# Patient Record
Sex: FEMALE | Race: WHITE | Employment: FULL TIME | URBAN - METROPOLITAN AREA
[De-identification: names, ages, dates, MRNs, and addresses within clinical notes are randomized per-mention and may not be internally consistent; named-entity substitution may affect disease eponyms.]

---

## 2018-01-15 ENCOUNTER — OFFICE VISIT (OUTPATIENT)
Dept: OBGYN CLINIC | Age: 33
End: 2018-01-15

## 2018-01-15 VITALS
DIASTOLIC BLOOD PRESSURE: 78 MMHG | SYSTOLIC BLOOD PRESSURE: 104 MMHG | BODY MASS INDEX: 25 KG/M2 | HEIGHT: 64 IN | WEIGHT: 146.4 LBS

## 2018-01-15 DIAGNOSIS — Z01.419 ENCOUNTER FOR GYNECOLOGICAL EXAMINATION WITHOUT ABNORMAL FINDING: Primary | ICD-10-CM

## 2018-01-15 RX ORDER — GABAPENTIN 400 MG/1
CAPSULE ORAL
COMMUNITY
Start: 2018-01-06 | End: 2018-07-31

## 2018-01-15 RX ORDER — ACETAMINOPHEN AND CODEINE PHOSPHATE 120; 12 MG/5ML; MG/5ML
SOLUTION ORAL
Refills: 0 | COMMUNITY
Start: 2017-12-27 | End: 2018-01-15

## 2018-01-15 RX ORDER — SERTRALINE HYDROCHLORIDE 100 MG/1
TABLET, FILM COATED ORAL DAILY
COMMUNITY

## 2018-01-15 RX ORDER — BISMUTH SUBSALICYLATE 262 MG
1 TABLET,CHEWABLE ORAL DAILY
COMMUNITY

## 2018-01-15 NOTE — PROGRESS NOTES
Chief Complaint   Patient presents with    Contraception     on roxana pill wants toswitch birth control      Annual exam ages 21-44    Alix Christy is a ,  33 y.o. female [facility] Patient's last menstrual period was 01/15/2018. .    She presents for her annual checkup. She is having no significant problems. Daughter now 13m old. Severe back pain post vaginal delivery with herniation of several discs; has worked [employer] w PT and injections to avoid surgery    With regard to the Gardasil vaccine, she has received all 3 injections. Menstrual status:    Her periods are light in flow. She is using three to five pads or tampons per day, usually regular and last 26-30 days. She denies dysmenorrhea. She reports no premenstrual symptoms. Contraception:    The current method of family planning is OCP (estrogen/progesterone). Sexual history:     She  reports that she currently engages in sexual activity and has had male partners. She reports using the following method of birth control/protection: Pill. .    Medical conditions:    Since her last annual GYN exam about one year ago, she has not the following changes in her health history: none. Pap and Mammogram History:    Her most recent Pap smear was normal, obtained 1 year(s) ago. Breast Cancer History/Substance Abuse: negative    Past Medical History:   Diagnosis Date    Asthma 1994    exertional episode in childhood; pt states very mild     History reviewed. No pertinent surgical history. Current Outpatient Prescriptions   Medication Sig Dispense Refill    multivitamin (ONE A DAY) tablet Take 1 Tab by mouth daily.  sertraline (ZOLOFT) 100 mg tablet Take  by mouth daily.  gabapentin (NEURONTIN) 400 mg capsule       norethindrone (MICRONOR) 0.35 mg tab TAKE 1 TABLET BY MOUTH EVERY DAY  0    ibuprofen (MOTRIN) 600 mg tablet Take 1 Tab by mouth every six (6) hours as needed for Pain.  30 Tab 0     Allergies: Review of patient's allergies indicates no known allergies. Tobacco History:  reports that she has never smoked. She has never used smokeless tobacco.  Alcohol Abuse:  reports that she drinks about 1.2 oz of alcohol per week   Drug Abuse:  reports that she does not use illicit drugs.     Family Medical/Cancer History:   Family History   Problem Relation Age of Onset    Asthma Mother     Cancer Mother      non hodgkins lymphmoa        Review of Systems - History obtained from the patient  Constitutional: negative for weight loss, fever, night sweats  HEENT: negative for hearing loss, earache, congestion, snoring, sorethroat  CV: negative for chest pain, palpitations, edema  Resp: negative for cough, shortness of breath, wheezing  GI: negative for change in bowel habits, abdominal pain, black or bloody stools  : negative for frequency, dysuria, hematuria, vaginal discharge  MSK: negative for back pain, joint pain, muscle pain  Breast: negative for breast lumps, nipple discharge, galactorrhea  Skin :negative for itching, rash, hives  Neuro: negative for dizziness, headache, confusion, weakness  Psych: negative for anxiety, depression, change in mood  Heme/lymph: negative for bleeding, bruising, pallor    Physical Exam    Visit Vitals    /78    Ht 5' 4\" (1.626 m)    Wt 146 lb 6.4 oz (66.4 kg)    LMP 01/15/2018    BMI 25.13 kg/m2       Constitutional  · Appearance: well-nourished, well developed, alert, in no acute distress    HENT  · Head and Face: appears normal    Chest  · Respiratory Effort: breathing unlabored    Breasts  · Inspection of Breasts: breasts symmetrical, no skin changes, no discharge present, nipple appearance normal, no skin retraction present  · Palpation of Breasts and Axillae: no masses present on palpation, no breast tenderness  · Axillary Lymph Nodes: no lymphadenopathy present    Gastrointestinal  · Abdominal Examination: abdomen non-tender to palpation, normal bowel sounds, no masses present  · Liver and spleen: no hepatomegaly present, spleen not palpable  · Hernias: no hernias identified    Genitourinary  · External Genitalia: normal appearance for age, no discharge present, no tenderness present, no inflammatory lesions present, no masses present, no atrophy present  · Vagina: normal vaginal vault without central or paravaginal defects, no discharge present, no inflammatory lesions present, no masses present  · Bladder: non-tender to palpation  · Urethra: appears normal  · Cervix: normal   · Uterus: normal size, shape and consistency  · Adnexa: no adnexal tenderness present, no adnexal masses present  · Perineum: perineum within normal limits, no evidence of trauma, no rashes or skin lesions present  · Anus: anus within normal limits, no hemorrhoids present  · Inguinal Lymph Nodes: no lymphadenopathy present    Skin  · General Inspection: no rash, no lesions identified    Neurologic/Psychiatric  · Mental Status:  · Orientation: grossly oriented to person, place and time  · Mood and Affect: mood normal, affect appropriate    . Assessment:  Routine gynecologic examination; on menses  Her current medical status is satisfactory with no evidence of significant gynecologic issues.     Plan:  Change off of micronor  Will work w back specialist regarding timing of next pregnancy  Hx of normal paps and no pap today; check one at next visit  Counseled re: diet, exercise, healthy lifestyle  Return for yearly wellness visits  Gardisil counseling provided  Rec screening mammo at either 36 or 41

## 2018-02-16 RX ORDER — ETONOGESTREL AND ETHINYL ESTRADIOL 11.7; 2.7 MG/1; MG/1
1 INSERT, EXTENDED RELEASE VAGINAL
Qty: 3 DEVICE | Refills: 0 | Status: SHIPPED | OUTPATIENT
Start: 2018-02-16 | End: 2018-05-10 | Stop reason: SDUPTHER

## 2018-04-11 RX ORDER — NORETHINDRONE ACETATE AND ETHINYL ESTRADIOL 1MG-20(21)
1 KIT ORAL DAILY
Qty: 3 PACKAGE | Refills: 3 | Status: SHIPPED | OUTPATIENT
Start: 2018-04-11 | End: 2018-07-31

## 2018-05-11 RX ORDER — ETONOGESTREL AND ETHINYL ESTRADIOL .12; .015 MG/D; MG/D
INSERT, EXTENDED RELEASE VAGINAL
Qty: 1 DEVICE | Refills: 12 | Status: SHIPPED | OUTPATIENT
Start: 2018-05-11 | End: 2018-06-28 | Stop reason: SDUPTHER

## 2018-05-15 RX ORDER — FLUCONAZOLE 150 MG/1
150 TABLET ORAL DAILY
Qty: 1 TAB | Refills: 0 | Status: SHIPPED | OUTPATIENT
Start: 2018-05-15 | End: 2018-05-16

## 2018-06-28 RX ORDER — ETONOGESTREL AND ETHINYL ESTRADIOL 11.7; 2.7 MG/1; MG/1
1 INSERT, EXTENDED RELEASE VAGINAL
Qty: 3 DEVICE | Refills: 2 | Status: SHIPPED | OUTPATIENT
Start: 2018-06-28 | End: 2018-07-31

## 2018-06-28 NOTE — PROGRESS NOTES
Received refill request from patient's pharmacy stating that patient's insurance requires 90 day refill. Patient's last appointment was 1/15/18. Refill sent per Dr. Yue Lechuga for 3 refills to get patient through until due for next appointment.

## 2018-07-31 ENCOUNTER — OFFICE VISIT (OUTPATIENT)
Dept: OBGYN CLINIC | Age: 33
End: 2018-07-31

## 2018-07-31 VITALS
WEIGHT: 149.4 LBS | SYSTOLIC BLOOD PRESSURE: 104 MMHG | HEIGHT: 64 IN | DIASTOLIC BLOOD PRESSURE: 72 MMHG | BODY MASS INDEX: 25.51 KG/M2

## 2018-07-31 DIAGNOSIS — B37.9 YEAST INFECTION: Primary | ICD-10-CM

## 2018-07-31 RX ORDER — FLUCONAZOLE 150 MG/1
150 TABLET ORAL DAILY
Qty: 1 TAB | Refills: 3 | Status: SHIPPED | OUTPATIENT
Start: 2018-07-31 | End: 2018-08-01

## 2018-07-31 NOTE — PROGRESS NOTES
Mic Chu is a 29 y.o. female who complains of frequent yeast infections. Has done a course of diflucan and several courses of Monistat, most recently 7/26-7/28 and still c/o symptoms - itching, irritation, white discharge. Has been on probiotic  Recently treated with amoxicillin. sxs relieved now on menses  Her current method of family planning is none. The patient is sexually active. She developed this problem approximately several months ago. Her relevant past medical history:   Past Medical History:   Diagnosis Date    Asthma 1994    exertional episode in childhood; pt states very mild        History reviewed. No pertinent surgical history. Social History     Occupational History    Not on file. Social History Main Topics    Smoking status: Never Smoker    Smokeless tobacco: Never Used    Alcohol use 1.2 oz/week     2 Glasses of wine per week      Comment: weekly    Drug use: No    Sexual activity: Yes     Partners: Male     Birth control/ protection: None     Family History   Problem Relation Age of Onset    Asthma Mother     Cancer Mother      non hodgkins lymphmoa       No Known Allergies  Prior to Admission medications    Medication Sig Start Date End Date Taking? Authorizing Provider   B.infantis-B.ani-B.long-Tracie (PROBIOTIC 4X) 10-15 mg TbEC Take  by mouth. Yes Historical Provider   multivitamin (ONE A DAY) tablet Take 1 Tab by mouth daily. Yes Historical Provider   sertraline (ZOLOFT) 100 mg tablet Take  by mouth daily. Yes Historical Provider   ethinyl estradiol-etonogestrel (NUVARING) 0.12-0.015 mg/24 hr vaginal ring 1 Each by Intravaginal route every twenty-one (21) days. 4/82/52   Gisele Wisdom MD   norethindrone-ethinyl estradiol (LOESTRIN FE 1/20, 28-DAY,) 1 mg-20 mcg (21)/75 mg (7) tab Take 1 Tab by mouth daily.  3/04/02   Gisele Wisdom MD   gabapentin (NEURONTIN) 400 mg capsule  1/6/18   Historical Provider   ibuprofen (MOTRIN) 600 mg tablet Take 1 Tab by mouth every six (6) hours as needed for Pain.  10/21/16   Lc Razo MD        Review of Systems - History obtained from the patient  Constitutional: negative for weight loss, fever, night sweats  HEENT: negative for hearing loss, earache, congestion, snoring, sorethroat  CV: negative for chest pain, palpitations, edema  Resp: negative for cough, shortness of breath, wheezing  Breast: negative for breast lumps, nipple discharge, galactorrhea  GI: negative for change in bowel habits, abdominal pain, black or bloody stools  : negative for frequency, dysuria, hematuria  MSK: negative for back pain, joint pain, muscle pain  Skin: negative for itching, rash, hives  Neuro: negative for dizziness, headache, confusion, weakness  Psych: negative for anxiety, depression, change in mood  Heme/lymph: negative for bleeding, bruising, pallor      Objective:  Visit Vitals    /72 (BP 1 Location: Left arm, BP Patient Position: Sitting)    Ht 5' 4\" (1.626 m)    Wt 149 lb 6.4 oz (67.8 kg)    LMP 07/29/2018    BMI 25.64 kg/m2          PHYSICAL EXAMINATION    Constitutional  · Appearance: well-nourished, well developed, alert, in no acute distress    HENT  · Head and Face: appears normal    Neck  · Inspection/Palpation: normal appearance, no masses or tenderness  · Lymph Nodes: no lymphadenopathy present  · Thyroid: gland size normal, nontender, no nodules or masses present on palpation  ·   Breasts  · Inspection of Breasts: breasts symmetrical, no skin changes, no discharge present, nipple appearance normal, no skin retraction present  · Palpation of Breasts and Axillae: no masses present on palpation, no breast tenderness  · Axillary Lymph Nodes: no lymphadenopathy present    Gastrointestinal  · Abdominal Examination: abdomen non-tender to palpation, normal bowel sounds, no masses present  · Liver and spleen: no hepatomegaly present, spleen not palpable  · Hernias: no hernias identified    Genitourinary  · External Genitalia: normal appearance for age, no discharge present, no tenderness present, no inflammatory lesions present, no masses present, no atrophy present  · Vagina: normal vaginal vault without central or paravaginal defects, no discharge present, no inflammatory lesions present, no masses present  · Bladder: non-tender to palpation  · Urethra: appears normal  · Cervix: normal   · Uterus: normal size, shape and consistency  · Adnexa: no adnexal tenderness present, no adnexal masses present  · Perineum: perineum within normal limits, no evidence of trauma, no rashes or skin lesions present  · Anus: anus within normal limits, no hemorrhoids present  · Inguinal Lymph Nodes: no lymphadenopathy present    Skin  · General Inspection: no rash, no lesions identified    Neurologic/Psychiatric  · Mental Status:  · Orientation: grossly oriented to person, place and time  · Mood and Affect: mood normal, affect appropriate    Assessment:      Resolution of recent yeast infection  Plan:   Script for diflucan  Preconceptual counseling

## 2021-06-04 ENCOUNTER — HOSPITAL ENCOUNTER (OUTPATIENT)
Dept: GENERAL RADIOLOGY | Age: 36
Discharge: HOME | End: 2021-06-04
Attending: PAIN MEDICINE
Payer: COMMERCIAL

## 2021-06-04 ENCOUNTER — TRANSCRIBE ORDER (OUTPATIENT)
Dept: GENERAL RADIOLOGY | Age: 36
End: 2021-06-04

## 2021-06-04 DIAGNOSIS — M51.16 LUMBAR DISC PROLAPSE WITH ROOT COMPRESSION: ICD-10-CM

## 2021-06-04 DIAGNOSIS — M51.16 LUMBAR DISC PROLAPSE WITH ROOT COMPRESSION: Primary | ICD-10-CM

## 2021-06-04 PROCEDURE — 72100 X-RAY EXAM L-S SPINE 2/3 VWS: CPT

## 2023-02-08 ENCOUNTER — TRANSCRIBE ORDER (OUTPATIENT)
Dept: SCHEDULING | Age: 38
End: 2023-02-08

## 2023-02-08 DIAGNOSIS — M51.9 INTERVERTEBRAL DISC DISORDER: Primary | ICD-10-CM

## 2023-02-14 ENCOUNTER — HOSPITAL ENCOUNTER (OUTPATIENT)
Dept: MRI IMAGING | Age: 38
Discharge: HOME | End: 2023-02-14
Attending: PAIN MEDICINE
Payer: COMMERCIAL

## 2023-02-14 DIAGNOSIS — M51.9 INTERVERTEBRAL DISC DISORDER: ICD-10-CM

## 2023-02-14 PROCEDURE — 72148 MRI LUMBAR SPINE W/O DYE: CPT

## 2023-02-16 ENCOUNTER — HOSPITAL ENCOUNTER (OUTPATIENT)
Dept: LAB | Age: 38
Discharge: HOME | End: 2023-02-16

## 2023-03-27 NOTE — PROGRESS NOTES
[facility] [place] at [address]  W: [phone]  F: [phone]    Reason for Visit:   Nidia Apgar is a 40 y.o. female with positive ANITA who is seen in consultation at the request of Dr. Noelle Caba for evaluation of elevated iron. History of Present Illness:   Nidia Apgar is a pleasant 40 y.o. female who presents today for evaluation of elevated iron. Patient is an excellent historian. 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 States that she has had progressive fatigue x 1 year. She had COVID in 2022. After that, broke out in herpetiform oral sores on mouth that lasted ~1 month. States that these have recurred 6 times since. Also continued to have progressive fatigue. Ended up requiring Adderall to focus, which was a significant difference for her. She was later evaluated at urgent care and diagnosed with flu. Has had testing to rule herpes. Biopsy of oral ulcers in 3873 lichen planus (reviewed in Media)   Has no history of vaginal ulcers. Also had cervical lymph nodes. Ultrasound confirmed ~2 cm lymphadenopathy. Was subsequently diagnosed with EBV mono in early January. Patient was seen by her Rheumatologist Dr. Tigist Mo  for evaluation of positive ANITA. Per outside note, she had labs that showed elevated iron 191, elevated TIBC, normal ferritin and iron sat. Labs from 23 notable for: Transferrin elevated at 421. Hereditary hemochromatosis testing negative (see below). 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 She does not take any oral iron. She has required oral iron for both pregnancies ( ). No history of blood transfusions. Denies abdominal bloating, melena, hematochezia.      Menstrual periods: lasts 5 days, light flow, requires tampon change every 8 hours        Labs (reviewed on patient's phone)  TIBC 478  Iron 191  Ferritin 19  TSH wnl    Social History: never smoker. Social EtOH use. 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 Excercises 3-4x per week. Family History:  Brother - psoriasis, Guiyon  Sister - no significant medical issues  Mother - non-Hodgkin lymphoma    Review of systems was obtained and pertinent findings reviewed above. Past medical history, social history, family history, medications, and allergies are located in the electronic medical record. Physical Exam:   Visit Vitals  /82 (BP 1 Location: Right upper arm, BP Patient Position: Sitting)   Pulse 96   Temp 97.5 °F (36.4 °C)   Resp 18   Ht 5' 4\" (1.626 m)   Wt 145 lb (65.8 kg)   SpO2 97%   BMI 24.89 kg/m²     ECOG PS: 0-1  General: no distress  Eyes: anicteric sclerae  HENT: oropharynx clear with no visible oral ulcers, slight mucositis, no thrush, no angular chelitis  Neck: supple  Lymphatic: shoddy R anterior cervical lymph node, mobile  Respiratory: normal respiratory effort  CV: RRR, no peripheral edema  Ext: warm, well perfused  GI: soft, nontender, nondistended, no masses  Skin: no rashes, no ecchymoses, no petechiae    Results:     Lab Results   Component Value Date/Time    WBC 10.6 10/17/2016 06:13 PM    HGB 12.9 10/17/2016 06:13 PM    HCT 37.3 10/17/2016 06:13 PM    PLATELET 273 66/45/1152 06:13 PM    MCV 88.6 10/17/2016 06:13 PM     No results found for: NA, K, CL, CO2, GLU, BUN, CREA, GFRAA, GFRNA, CA, NAPOC, KPOCT, CLPOC, GLUCPOC, IBUN, CREAPOC, ICAI  No results found for: TBILI, ALT, AP, TP, ALB, GLOB    Records from Dr. Villa Race office reviewed in Media and summarized above. Test results above have been reviewed. Assessment:     #) Elevated iron and transferrin, low ferritin:   Patient has incongruent lab studies with low ferritin (19) and high TIBC, which are suggestive of iron deficiency, but elevated iron and elevated transferrin. The pattern does not appear consistent with hemochromatosis or iron overload.     HFE genetic testing negative. Will plan to repeat the ferritin and iron testing now. #) Positive oral ulcers, ANITA, fatigue: evaluation by Rheumatology. Biopsy of oral ulcer not diagnostic (?lichen planus). HIV screen, total immunoglobulins. 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   Further management per Rheumatologist.    Plan:     CBC with diff, ferritin, iron profile, retic count, LDH, ESR, CRP HIV  Return to clinic in ~1 week to discuss results    Signed By: Antoinette Taylor MD

## 2023-03-28 ENCOUNTER — OFFICE VISIT (OUTPATIENT)
Dept: ONCOLOGY | Age: 38
End: 2023-03-28
Payer: COMMERCIAL

## 2023-03-28 VITALS
BODY MASS INDEX: 24.75 KG/M2 | TEMPERATURE: 97.5 F | SYSTOLIC BLOOD PRESSURE: 123 MMHG | HEART RATE: 96 BPM | HEIGHT: 64 IN | RESPIRATION RATE: 18 BRPM | WEIGHT: 145 LBS | DIASTOLIC BLOOD PRESSURE: 82 MMHG | OXYGEN SATURATION: 97 %

## 2023-03-28 DIAGNOSIS — R53.83 FATIGUE, UNSPECIFIED TYPE: ICD-10-CM

## 2023-03-28 DIAGNOSIS — R79.89 ELEVATED FERRITIN: Primary | ICD-10-CM

## 2023-03-28 DIAGNOSIS — K13.79 RECURRENT ORAL ULCERS: ICD-10-CM

## 2023-03-28 DIAGNOSIS — E61.1 IRON DEFICIENCY: ICD-10-CM

## 2023-03-28 PROCEDURE — 99204 OFFICE O/P NEW MOD 45 MIN: CPT | Performed by: INTERNAL MEDICINE

## 2023-03-28 RX ORDER — GABAPENTIN 300 MG/1
300 CAPSULE ORAL AS NEEDED
COMMUNITY

## 2023-03-28 RX ORDER — ERGOCALCIFEROL 1.25 MG/1
50000 CAPSULE ORAL
COMMUNITY

## 2023-03-28 RX ORDER — DEXTROAMPHETAMINE SACCHARATE, AMPHETAMINE ASPARTATE, DEXTROAMPHETAMINE SULFATE AND AMPHETAMINE SULFATE 3.75; 3.75; 3.75; 3.75 MG/1; MG/1; MG/1; MG/1
15 TABLET ORAL
COMMUNITY

## 2023-03-28 NOTE — PROGRESS NOTES
Clarence Esquivel is a 40 y.o. female    elevated iron. 1. Have you been to the ER, urgent care clinic since your last visit? Hospitalized since your last visit? N/A    2. Have you seen or consulted any other health care providers outside of the [address] [place] [address] since your last visit? Include any pap smears or colon screening.  N/A

## 2023-03-28 NOTE — LETTER
3/28/2023    Patient: Naren Enriquez   YOB: 1985   Date of Visit: 3/28/2023     Mark Jarrett NP  [address] [facility] [address]  Via Fax: [phone]    Dear Mark Jarrett NP,      Thank you for referring Ms. Naren Enriquez to [address] [place] [address] for evaluation. My notes for this consultation are attached. If you have questions, please do not hesitate to call me. I look forward to following your patient along with you.       Sincerely,    Bianka Li MD

## 2023-04-03 LAB
ALBUMIN SERPL-MCNC: 4.5 G/DL (ref 3.8–4.8)
ALBUMIN/GLOB SERPL: 1.7 {RATIO} (ref 1.2–2.2)
ALP SERPL-CCNC: 80 IU/L (ref 44–121)
ALT SERPL-CCNC: 12 IU/L (ref 0–32)
AST SERPL-CCNC: 22 IU/L (ref 0–40)
BASOPHILS # BLD AUTO: 0 X10E3/UL (ref 0–0.2)
BASOPHILS NFR BLD AUTO: 1 %
BILIRUB SERPL-MCNC: 0.4 MG/DL (ref 0–1.2)
BUN SERPL-MCNC: 11 MG/DL (ref 6–20)
BUN/CREAT SERPL: 14 (ref 9–23)
CALCIUM SERPL-MCNC: 10.2 MG/DL (ref 8.7–10.2)
CHLORIDE SERPL-SCNC: 98 MMOL/L (ref 96–106)
CO2 SERPL-SCNC: 25 MMOL/L (ref 20–29)
CREAT SERPL-MCNC: 0.81 MG/DL (ref 0.57–1)
CRP SERPL-MCNC: 38 MG/L (ref 0–10)
EGFRCR SERPLBLD CKD-EPI 2021: 96 ML/MIN/1.73
EOSINOPHIL # BLD AUTO: 0.2 X10E3/UL (ref 0–0.4)
EOSINOPHIL NFR BLD AUTO: 4 %
ERYTHROCYTE [DISTWIDTH] IN BLOOD BY AUTOMATED COUNT: 12.2 % (ref 11.7–15.4)
ERYTHROCYTE [SEDIMENTATION RATE] IN BLOOD BY WESTERGREN METHOD: 25 MM/HR (ref 0–32)
FERRITIN SERPL-MCNC: 31 NG/ML (ref 15–150)
GLIADIN PEPTIDE IGA SER-ACNC: 4 UNITS (ref 0–19)
GLOBULIN SER CALC-MCNC: 2.7 G/DL (ref 1.5–4.5)
GLUCOSE SERPL-MCNC: 89 MG/DL (ref 70–99)
HCT VFR BLD AUTO: 34.2 % (ref 34–46.6)
HGB BLD-MCNC: 11.9 G/DL (ref 11.1–15.9)
HIV 1+2 AB+HIV1 P24 AG SERPL QL IA: NON REACTIVE
IGA SERPL-MCNC: 165 MG/DL (ref 87–352)
IGG SERPL-MCNC: 1022 MG/DL (ref 586–1602)
IGM SERPL-MCNC: 213 MG/DL (ref 26–217)
IMM GRANULOCYTES # BLD AUTO: 0 X10E3/UL (ref 0–0.1)
IMM GRANULOCYTES NFR BLD AUTO: 0 %
IRON SATN MFR SERPL: 12 % (ref 15–55)
IRON SERPL-MCNC: 63 UG/DL (ref 27–159)
LDH SERPL L TO P-CCNC: 158 IU/L (ref 119–226)
LYMPHOCYTES # BLD AUTO: 1 X10E3/UL (ref 0.7–3.1)
LYMPHOCYTES NFR BLD AUTO: 19 %
MCH RBC QN AUTO: 30.7 PG (ref 26.6–33)
MCHC RBC AUTO-ENTMCNC: 34.8 G/DL (ref 31.5–35.7)
MCV RBC AUTO: 88 FL (ref 79–97)
MONOCYTES # BLD AUTO: 0.5 X10E3/UL (ref 0.1–0.9)
MONOCYTES NFR BLD AUTO: 10 %
NEUTROPHILS # BLD AUTO: 3.6 X10E3/UL (ref 1.4–7)
NEUTROPHILS NFR BLD AUTO: 66 %
PLATELET # BLD AUTO: 250 X10E3/UL (ref 150–450)
POTASSIUM SERPL-SCNC: 4.8 MMOL/L (ref 3.5–5.2)
PROT SERPL-MCNC: 7.2 G/DL (ref 6–8.5)
RBC # BLD AUTO: 3.87 X10E6/UL (ref 3.77–5.28)
RETICS/RBC NFR AUTO: 1.1 % (ref 0.6–2.6)
SODIUM SERPL-SCNC: 137 MMOL/L (ref 134–144)
TIBC SERPL-MCNC: 509 UG/DL (ref 250–450)
TTG IGA SER-ACNC: <2 U/ML (ref 0–3)
UIBC SERPL-MCNC: 446 UG/DL (ref 131–425)
WBC # BLD AUTO: 5.4 X10E3/UL (ref 3.4–10.8)

## 2023-04-07 PROBLEM — E61.1 IRON DEFICIENCY: Status: ACTIVE | Noted: 2023-04-07

## 2023-04-18 ENCOUNTER — OFFICE VISIT (OUTPATIENT)
Dept: ONCOLOGY | Age: 38
End: 2023-04-18
Payer: COMMERCIAL

## 2023-04-18 VITALS
WEIGHT: 148 LBS | DIASTOLIC BLOOD PRESSURE: 87 MMHG | OXYGEN SATURATION: 99 % | RESPIRATION RATE: 18 BRPM | SYSTOLIC BLOOD PRESSURE: 125 MMHG | HEART RATE: 101 BPM | HEIGHT: 64 IN | TEMPERATURE: 98 F | BODY MASS INDEX: 25.27 KG/M2

## 2023-04-18 DIAGNOSIS — E61.1 IRON DEFICIENCY: Primary | ICD-10-CM

## 2023-04-18 DIAGNOSIS — D89.0 POLYCLONAL GAMMOPATHY: ICD-10-CM

## 2023-04-18 PROCEDURE — 99213 OFFICE O/P EST LOW 20 MIN: CPT | Performed by: INTERNAL MEDICINE

## 2023-04-18 NOTE — PROGRESS NOTES
[facility] at [address]  W: [phone]  F: [phone]    Reason for Visit:   Shanice Soria is a 40 y.o. female with positive ANITA who is seen for follow up of iron deficiency. Assessment:     #) Iron deficiency:   Repeat labs with clear iron deficiency (ferritin 31, iron sat 12%, high TIBC). Likely contributing to fatigue. Started on oral iron every third day with very poor tolerance. Etiology likely due to menstrual blood loss. Celiac screen negative. Proceed with IV iron infusions as scheduled    #) Positive ANITA, oral ulcers: evaluation by Rheumatology. Biopsy of oral ulcer not diagnostic (?lichen planus). Stopped since weaning off Zoloft. Possibly related to Zoloft. HIV negative, total immunoglobulins wnl. 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 Further management per Rheumatologist.    Plan:     Stop oral iron given intolerance  Proceed with IV iron x 2 doses  Return to clinic in 4 months with repeat labs prior to visit    Signed By: Evangelista Sanchez MD        History of Present Illness:   Shanice Soria is a pleasant 40 y.o. female who presents today for evaluation of elevated iron. Patient is an excellent historian. 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 States that she has had progressive fatigue x 1 year. She had COVID in July 2022. After that, broke out in herpetiform oral sores on mouth that lasted ~1 month. States that these have recurred 6 times since. Also continued to have progressive fatigue. Ended up requiring Adderall to focus, which was a significant difference for her. She was later evaluated at urgent care and diagnosed with flu. Has had testing to rule herpes. Biopsy of oral ulcers in 8/5626 lichen planus (reviewed in Media)   Has no history of vaginal ulcers. Also had cervical lymph nodes. Ultrasound confirmed ~2 cm lymphadenopathy.   Was subsequently diagnosed with EBV mono in early January. Patient was seen by her Rheumatologist Dr. Evy Collier  for evaluation of positive ANITA. Per outside note, she had labs that showed elevated iron 191, elevated TIBC, normal ferritin and iron sat. Labs from 23 notable for: Transferrin elevated at 421. Hereditary hemochromatosis testing negative (see below). 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 She does not take any oral iron. She has required oral iron for both pregnancies ( ). No history of blood transfusions. Denies abdominal bloating, melena, hematochezia. Menstrual periods: lasts 5 days, light flow, requires tampon change every 8 hours        Labs (reviewed on patient's phone)  TIBC 478  Iron 191  Ferritin 19  TSH wnl    Interval History:  She is feeling much better. She has accepted a new position at [employer] and is very excited about it. States a weight has been lifted knowing that her new schedule will not involve nights and weekends. Weaned off of Zoloft in the last month. Stopped 2 weeks ago. Has not had any recurrent ulcers. Labs on 3/31/23 with normal WBC, normal diff, normal hemoglobin (11.9), and normal platelets but iron deficiency with ferritin 31, iron sat 12%, TIBC elevated. Started ferrous sulfate. Unable to tolerate due to GI distress but has been trying to take every third day. Celiac screening negative   Total immunoglobulins wnl  Cr, LFTs wnl  ESR, LD wnl  HIV negative   CRP elevated at 38    Social History: never smoker. Social EtOH use. 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 Excercises 3-4x per week. Family History:  Brother - psoriasis, Guiyon  Sister - no significant medical issues  Mother - non-Hodgkin lymphoma    Review of systems was obtained and pertinent findings reviewed above.  Past medical history, social history, family history, medications, and allergies are located in the electronic medical record. Physical Exam:   Visit Vitals  /87 (BP 1 Location: Right upper arm, BP Patient Position: Sitting)   Pulse (!) 101   Temp 98 °F (36.7 °C)   Resp 18   Ht 5' 4\" (1.626 m)   Wt 148 lb (67.1 kg)   SpO2 99%   BMI 25.40 kg/m²       ECOG PS: 0-1  General: no distress  Eyes: anicteric sclerae  HENT: oropharynx clear with no visible oral ulcers, slight mucositis, no thrush, no angular chelitis  Neck: supple  Lymphatic: shoddy R anterior cervical lymph node, mobile  Respiratory: normal respiratory effort  CV: RRR, no peripheral edema  Ext: warm, well perfused  GI: soft, nontender, nondistended, no masses  Skin: no rashes, no ecchymoses, no petechiae    Results:     Lab Results   Component Value Date/Time    WBC 5.4 03/31/2023 10:55 AM    HGB 11.9 03/31/2023 10:55 AM    HCT 34.2 03/31/2023 10:55 AM    PLATELET 209 14/63/1063 10:55 AM    MCV 88 03/31/2023 10:55 AM    ABS. NEUTROPHILS 3.6 03/31/2023 10:55 AM     Lab Results   Component Value Date/Time    Sodium 137 03/31/2023 10:55 AM    Potassium 4.8 03/31/2023 10:55 AM    Chloride 98 03/31/2023 10:55 AM    CO2 25 03/31/2023 10:55 AM    Glucose 89 03/31/2023 10:55 AM    BUN 11 03/31/2023 10:55 AM    Creatinine 0.81 03/31/2023 10:55 AM    Calcium 10.2 03/31/2023 10:55 AM     Lab Results   Component Value Date/Time    Bilirubin, total 0.4 03/31/2023 10:55 AM    ALT (SGPT) 12 03/31/2023 10:55 AM    Alk. phosphatase 80 03/31/2023 10:55 AM    Protein, total 7.2 03/31/2023 10:55 AM    Albumin 4.5 03/31/2023 10:55 AM       Records from Dr. Liborio Kim office reviewed in Media and summarized above. Test results above have been reviewed.

## 2023-04-18 NOTE — PROGRESS NOTES
Thiago Schofield is a 40 y.o. female    follow up of iron deficiency. 1. Have you been to the ER, urgent care clinic since your last visit? Hospitalized since your last visit? No    2. Have you seen or consulted any other health care providers outside of the [address] [place] [address] since your last visit? Include any pap smears or colon screening.  No

## 2023-04-20 ENCOUNTER — APPOINTMENT (OUTPATIENT)
Dept: INFUSION THERAPY | Age: 38
End: 2023-04-20

## 2023-04-21 ENCOUNTER — HOSPITAL ENCOUNTER (OUTPATIENT)
Dept: INFUSION THERAPY | Age: 38
Discharge: HOME | End: 2023-04-21
Attending: INTERNAL MEDICINE
Payer: COMMERCIAL

## 2023-04-21 VITALS
WEIGHT: 148 LBS | TEMPERATURE: 98.9 F | BODY MASS INDEX: 25.27 KG/M2 | SYSTOLIC BLOOD PRESSURE: 100 MMHG | RESPIRATION RATE: 18 BRPM | DIASTOLIC BLOOD PRESSURE: 78 MMHG | HEIGHT: 64 IN | HEART RATE: 70 BPM | OXYGEN SATURATION: 99 %

## 2023-04-21 DIAGNOSIS — E61.1 IRON DEFICIENCY: Primary | ICD-10-CM

## 2023-04-21 PROCEDURE — 74011250636 HC RX REV CODE- 250/636: Performed by: INTERNAL MEDICINE

## 2023-04-21 PROCEDURE — 96365 THER/PROPH/DIAG IV INF INIT: CPT

## 2023-04-21 PROCEDURE — 74011000250 HC RX REV CODE- 250: Performed by: INTERNAL MEDICINE

## 2023-04-21 PROCEDURE — 96366 THER/PROPH/DIAG IV INF ADDON: CPT

## 2023-04-21 RX ORDER — HYDROCORTISONE SODIUM SUCCINATE 100 MG/2ML
100 INJECTION, POWDER, FOR SOLUTION INTRAMUSCULAR; INTRAVENOUS AS NEEDED
Status: CANCELLED | OUTPATIENT
Start: 2023-04-28

## 2023-04-21 RX ORDER — HEPARIN 100 UNIT/ML
500 SYRINGE INTRAVENOUS AS NEEDED
Status: ACTIVE | OUTPATIENT
Start: 2023-04-21 | End: 2023-04-21

## 2023-04-21 RX ORDER — DIPHENHYDRAMINE HYDROCHLORIDE 50 MG/ML
25 INJECTION, SOLUTION INTRAMUSCULAR; INTRAVENOUS AS NEEDED
Status: CANCELLED
Start: 2023-04-28

## 2023-04-21 RX ORDER — SODIUM CHLORIDE 9 MG/ML
5-250 INJECTION, SOLUTION INTRAVENOUS AS NEEDED
Status: DISPENSED | OUTPATIENT
Start: 2023-04-21 | End: 2023-04-21

## 2023-04-21 RX ORDER — SODIUM CHLORIDE 9 MG/ML
5-250 INJECTION, SOLUTION INTRAVENOUS AS NEEDED
Status: CANCELLED | OUTPATIENT
Start: 2023-04-28

## 2023-04-21 RX ORDER — SODIUM CHLORIDE 9 MG/ML
5-40 INJECTION INTRAVENOUS AS NEEDED
Status: CANCELLED | OUTPATIENT
Start: 2023-04-28

## 2023-04-21 RX ORDER — HEPARIN 100 UNIT/ML
500 SYRINGE INTRAVENOUS AS NEEDED
Status: CANCELLED
Start: 2023-04-28

## 2023-04-21 RX ORDER — DIPHENHYDRAMINE HYDROCHLORIDE 50 MG/ML
50 INJECTION, SOLUTION INTRAMUSCULAR; INTRAVENOUS AS NEEDED
Status: CANCELLED
Start: 2023-04-28

## 2023-04-21 RX ORDER — SODIUM CHLORIDE 0.9 % (FLUSH) 0.9 %
5-40 SYRINGE (ML) INJECTION AS NEEDED
Status: CANCELLED | OUTPATIENT
Start: 2023-04-28

## 2023-04-21 RX ORDER — ALBUTEROL SULFATE 0.83 MG/ML
2.5 SOLUTION RESPIRATORY (INHALATION) AS NEEDED
Status: CANCELLED
Start: 2023-04-28

## 2023-04-21 RX ORDER — ACETAMINOPHEN 325 MG/1
650 TABLET ORAL AS NEEDED
Status: CANCELLED
Start: 2023-04-28

## 2023-04-21 RX ORDER — SODIUM CHLORIDE 0.9 % (FLUSH) 0.9 %
5-40 SYRINGE (ML) INJECTION AS NEEDED
Status: DISPENSED | OUTPATIENT
Start: 2023-04-21 | End: 2023-04-21

## 2023-04-21 RX ORDER — ONDANSETRON 2 MG/ML
8 INJECTION INTRAMUSCULAR; INTRAVENOUS AS NEEDED
Status: CANCELLED | OUTPATIENT
Start: 2023-04-28

## 2023-04-21 RX ORDER — EPINEPHRINE 1 MG/ML
0.3 INJECTION, SOLUTION, CONCENTRATE INTRAVENOUS AS NEEDED
Status: CANCELLED | OUTPATIENT
Start: 2023-04-28

## 2023-04-21 RX ORDER — SODIUM CHLORIDE 9 MG/ML
5-40 INJECTION INTRAVENOUS AS NEEDED
Status: ACTIVE | OUTPATIENT
Start: 2023-04-21 | End: 2023-04-21

## 2023-04-21 RX ADMIN — SODIUM CHLORIDE 25 ML/HR: 9 INJECTION, SOLUTION INTRAVENOUS at 09:00

## 2023-04-21 RX ADMIN — SODIUM CHLORIDE, PRESERVATIVE FREE 10 ML: 5 INJECTION INTRAVENOUS at 08:40

## 2023-04-21 RX ADMIN — SODIUM CHLORIDE, PRESERVATIVE FREE 10 ML: 5 INJECTION INTRAVENOUS at 11:20

## 2023-04-21 RX ADMIN — IRON SUCROSE 300 MG: 20 INJECTION, SOLUTION INTRAVENOUS at 09:30

## 2023-04-21 NOTE — PROGRESS NOTES
[place] Short Note                   Date: 2023  Name: Telma Mast  MRN: [id number]       : 1985    0835: Pt admit to [facility] for Iron Sucrose  Denies pain, fever, cough, N/V, recent falls. PIV Left AC, 24g. Positive blood return. Tolerated infusion well without issue. Patient declined post infusion monitoring time. Education provided. PIV removed post infusion. Coban and gauze applied to site    Patient aware of upcoming appointment. Ms. Zamudio's vitals were reviewed prior to treatment.    Patient Vitals for the past 12 hrs:   Temp Pulse Resp BP SpO2   23 1115 98.9 °F (37.2 °C) 70 18 100/78 99 %   23 0835 98.9 °F (37.2 °C) 90 18 104/69 99 %     Medications Administered       0.9% sodium chloride infusion       Admin Date  2023 Action  New Bag Dose  25 mL/hr Rate  25 mL/hr Route  IntraVENous Administered By  Hussein Alexander RN         0.9% sodium chloride injection 5-40 mL       Admin Date  2023 Action  Given Dose  10 mL Route  IntraVENous Administered By  Hussein Alexander RN      Admin Date  2023 Action  Given Dose  10 mL Route  IntraVENous Administered By  Hussein Alexander RN         iron sucrose (VENOFER) 300 mg in 0.9% sodium chloride 250 mL IVPB       Admin Date  2023 Action  New Bag Dose  300 mg Rate  193.3 mL/hr Route  IntraVENous Administered By  Hussein Alexander RN        Future Appointments   Date Time Provider Nino Perdue   2023  9:00 AM [facility]   2023  1:00 PM Jacqueline Salcedo  N Nish Astudillo RN  2023  -----------------------------------------------------------------------------------------------------------------  Problem: Chemotherapy Treatment  Goal: *Hemodynamically stable  Outcome: Progressing Towards Goal  Goal: *Tolerating diet  Outcome: Progressing Towards Goal     Problem: Neutropenia  Goal: *Verbalizes and demonstrates neutropenic precautions  Outcome: Progressing Towards Goal  Goal: *Verbalizes understanding and describes prescribed diet  Outcome: Progressing Towards Goal     Problem: Infection - Risk of, Central Venous Catheter-Associated Bloodstream Infection  Goal: *Absence of infection signs and symptoms  Outcome: Progressing Towards Goal     Problem: Pain  Goal: *Control of Pain  Outcome: Progressing Towards Goal     Problem: Anemia Care Plan (Adult and Pediatric)  Goal: *Labs within defined limits  Outcome: Progressing Towards Goal  Goal: *Tolerates increased activity  Outcome: Progressing Towards Goal     Problem: Anxiety  Goal: *Alleviation of anxiety  Outcome: Progressing Towards Goal     Problem: Altered Thought Process (Adult)  Goal: *Participates in Treatment Plan  Outcome: Progressing Towards Goal     Problem: Constipation - Risk of  Goal: *Prevention of constipation  Outcome: Progressing Towards Goal     Problem: Knowledge Deficit  Goal: *Verbalizes understanding of procedures and medications  Outcome: Progressing Towards Goal     Problem: Patient Education:  Go to Education Activity  Goal: Patient/Family Education  Outcome: Progressing Towards Goal

## 2023-04-23 DIAGNOSIS — E61.1 IRON DEFICIENCY: Primary | ICD-10-CM

## 2023-04-24 DIAGNOSIS — E61.1 IRON DEFICIENCY: Primary | ICD-10-CM

## 2023-04-25 DIAGNOSIS — E61.1 IRON DEFICIENCY: Primary | ICD-10-CM

## 2023-04-26 ENCOUNTER — APPOINTMENT (OUTPATIENT)
Dept: INFUSION THERAPY | Age: 38
End: 2023-04-26
Attending: INTERNAL MEDICINE

## 2023-05-05 ENCOUNTER — APPOINTMENT (OUTPATIENT)
Dept: INFUSION THERAPY | Age: 38
End: 2023-05-05
Attending: INTERNAL MEDICINE

## 2023-05-22 RX ORDER — GABAPENTIN 300 MG/1
300 CAPSULE ORAL PRN
COMMUNITY

## 2023-05-22 RX ORDER — ERGOCALCIFEROL 1.25 MG/1
50000 CAPSULE ORAL
COMMUNITY

## 2023-05-22 RX ORDER — DEXTROAMPHETAMINE SACCHARATE, AMPHETAMINE ASPARTATE, DEXTROAMPHETAMINE SULFATE AND AMPHETAMINE SULFATE 3.75; 3.75; 3.75; 3.75 MG/1; MG/1; MG/1; MG/1
15 TABLET ORAL
COMMUNITY

## 2023-08-09 ENCOUNTER — TELEPHONE (OUTPATIENT)
Age: 38
End: 2023-08-09

## 2023-08-09 NOTE — TELEPHONE ENCOUNTER
1324    Tried to call patient. And I Was only able to reach Voicemail. I have Left a detailed message just letting her know about up coming appointment and patient need labs prior  to visit to please give our office a call back if any question or concerns.

## 2023-08-11 LAB
BASOPHILS # BLD AUTO: 0 X10E3/UL (ref 0–0.2)
BASOPHILS NFR BLD AUTO: 0 %
EOSINOPHIL # BLD AUTO: 0.2 X10E3/UL (ref 0–0.4)
EOSINOPHIL NFR BLD AUTO: 3 %
ERYTHROCYTE [DISTWIDTH] IN BLOOD BY AUTOMATED COUNT: 11.9 % (ref 11.7–15.4)
FERRITIN SERPL-MCNC: 45 NG/ML (ref 15–150)
HCT VFR BLD AUTO: 36.3 % (ref 34–46.6)
HGB BLD-MCNC: 12.1 G/DL (ref 11.1–15.9)
IMM GRANULOCYTES # BLD AUTO: 0 X10E3/UL (ref 0–0.1)
IMM GRANULOCYTES NFR BLD AUTO: 0 %
IRON SATN MFR SERPL: 33 % (ref 15–55)
IRON SERPL-MCNC: 155 UG/DL (ref 27–159)
LYMPHOCYTES # BLD AUTO: 1.9 X10E3/UL (ref 0.7–3.1)
LYMPHOCYTES NFR BLD AUTO: 31 %
MCH RBC QN AUTO: 30.3 PG (ref 26.6–33)
MCHC RBC AUTO-ENTMCNC: 33.3 G/DL (ref 31.5–35.7)
MCV RBC AUTO: 91 FL (ref 79–97)
MONOCYTES # BLD AUTO: 0.4 X10E3/UL (ref 0.1–0.9)
MONOCYTES NFR BLD AUTO: 7 %
NEUTROPHILS # BLD AUTO: 3.7 X10E3/UL (ref 1.4–7)
NEUTROPHILS NFR BLD AUTO: 59 %
PLATELET # BLD AUTO: 297 X10E3/UL (ref 150–450)
RBC # BLD AUTO: 3.99 X10E6/UL (ref 3.77–5.28)
TIBC SERPL-MCNC: 468 UG/DL (ref 250–450)
UIBC SERPL-MCNC: 313 UG/DL (ref 131–425)
WBC # BLD AUTO: 6.2 X10E3/UL (ref 3.4–10.8)

## 2023-08-13 NOTE — PROGRESS NOTES
[id number] [facility]   [phone]    Hematology / Oncology Follow-up    Reason for Visit:   Junie Ruiz is a 41 y.o. female who is seen for follow-up of iron deficiency. Assessment:   #) Iron deficiency:  Felt to be contributing to fatigue. Had very difficult time tolerating oral iron, even at every third day dosing. Etiology  likely due to menstrual blood loss. Celiac screen negative. Status post Venofer 300 mg daily x 1. Unable to tolerate due to bone pain. Resumed slow release iron every third day with ongoing constipation. Repeat labs with resolution of iron deficiency. #) Positive ROB, oral ulcers: evaluation by Rheumatology. Biopsy of oral ulcer not diagnostic (?lichen planus). HIV negative, total immunoglobulins wnl. Evaluated by dentist who recommended biopsy review at [facility]. 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 Further management per Rheumatologist    Plan:     Okay to discontinue oral iron  Recommend repeat CBC, ferritin, and iron profile in 6 months with PCP  No Hematology follow up    Thank you for involving me in the care of this patient. Signed By: Ishaan Chávez MD    August 14, 2023        Interval History:    Patient is an excellent historian. 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 States that she has had progressive fatigue x 1 year. She had COVID in July 2022. After that, broke out in herpetiform oral sores on mouth that lasted ~1  month. States that these have recurred 6 times since. Also continued to have progressive fatigue. Ended up requiring Adderall to focus, which was a significant difference for her. She was later evaluated at urgent care and diagnosed with flu. Has  had testing to rule herpes. Biopsy of oral ulcers in 8/0026 lichen planus (reviewed in Media). Has no history of vaginal ulcers. Also had cervical lymph nodes.   Ultrasound confirmed ~2 cm

## 2023-08-14 ENCOUNTER — OFFICE VISIT (OUTPATIENT)
Age: 38
End: 2023-08-14

## 2023-08-14 VITALS
HEART RATE: 92 BPM | RESPIRATION RATE: 18 BRPM | OXYGEN SATURATION: 98 % | DIASTOLIC BLOOD PRESSURE: 76 MMHG | SYSTOLIC BLOOD PRESSURE: 115 MMHG | WEIGHT: 153 LBS | BODY MASS INDEX: 26.26 KG/M2

## 2023-08-14 DIAGNOSIS — E61.1 IRON DEFICIENCY: Primary | ICD-10-CM

## 2023-08-14 PROCEDURE — 99213 OFFICE O/P EST LOW 20 MIN: CPT | Performed by: INTERNAL MEDICINE

## 2023-08-14 NOTE — PROGRESS NOTES
Erasmo Ratliff is a 41 y.o. female     Chief Complaint   Patient presents with    Follow-up     iron deficiency       1. Have you been to the ER, urgent care clinic since your last visit? Hospitalized since your last visit? No    2. Have you seen or consulted any other health care providers outside of the [address] [place] [address] Avenue since your last visit? Include any pap smears or colon screening. Yes for back .

## 2023-09-03 ENCOUNTER — OFFICE VISIT (OUTPATIENT)
Age: 38
End: 2023-09-03

## 2023-09-03 VITALS
TEMPERATURE: 98.6 F | WEIGHT: 154.6 LBS | HEART RATE: 93 BPM | RESPIRATION RATE: 20 BRPM | OXYGEN SATURATION: 97 % | SYSTOLIC BLOOD PRESSURE: 124 MMHG | BODY MASS INDEX: 26.4 KG/M2 | DIASTOLIC BLOOD PRESSURE: 88 MMHG | HEIGHT: 64 IN

## 2023-09-03 DIAGNOSIS — J20.8 ACUTE BRONCHITIS DUE TO OTHER SPECIFIED ORGANISMS: ICD-10-CM

## 2023-09-03 DIAGNOSIS — R09.89 RHONCHI AT BOTH LUNG BASES: Primary | ICD-10-CM

## 2023-09-03 RX ORDER — ALBUTEROL SULFATE 90 UG/1
2 AEROSOL, METERED RESPIRATORY (INHALATION) 4 TIMES DAILY PRN
Qty: 18 G | Refills: 0 | Status: SHIPPED | OUTPATIENT
Start: 2023-09-03

## 2023-09-03 RX ORDER — PREDNISONE 50 MG/1
50 TABLET ORAL DAILY
Qty: 3 TABLET | Refills: 0 | Status: SHIPPED | OUTPATIENT
Start: 2023-09-03 | End: 2023-09-06

## 2023-09-03 RX ORDER — AZITHROMYCIN 250 MG/1
250 TABLET, FILM COATED ORAL SEE ADMIN INSTRUCTIONS
Qty: 6 TABLET | Refills: 0 | Status: SHIPPED | OUTPATIENT
Start: 2023-09-03 | End: 2023-09-08

## 2023-09-03 RX ORDER — NORGESTIMATE AND ETHINYL ESTRADIOL 0.25-0.035
1 KIT ORAL DAILY
COMMUNITY